# Patient Record
(demographics unavailable — no encounter records)

---

## 2017-06-08 NOTE — EMERGENCY ROOM REPORT
History of Present Illness


General


Chief Complaint:  Pain


Source:  Patient





Present Illness


HPI


Patient presents with complaints of left shoulder pain


Reports recent major oral surgery


And she was concerned that the surgery could potentially have led to her 

discomfort


Denies any fall or trauma


Denies any chest pain or shortness of breath





Patient has very a specific discomfort localized to the lateral aspect of the 

left shoulder


Pain with any attempts at movement of the shoulder and appears to be very 

mechanical denies any neck pain





Denies any abdominal pain denies any swelling of the arm





Please note that the conversation was performed using pen and paper as the 

patient is not able to verbalize secondary to the recent oral surgery


Allergies:  


Coded Allergies:  


     No Known Allergies (Unverified , 2/9/15)





Patient History


Past Medical History:  see triage record


Pertinent Family History:  none


Pregnant Now:  No


Reviewed Nursing Documentation:  PMH: Agreed, PSxH: Agreed





Review of Systems


All Other Systems:  negative except mentioned in HPI





Physical Exam





Vital Signs








  Date Time  Temp Pulse Resp B/P Pulse Ox O2 Delivery O2 Flow Rate FiO2


 


6/8/17 02:23  89 14 159/85 99 Room Air  








Sp02 EP Interpretation:  reviewed, normal


General Appearance:  no apparent distress


Head:  normocephalic, atraumatic


Eyes:  bilateral eye EOMI, bilateral eye PERRL


ENT:  other - Patient has teeth clenched, has apparent recent oral surgery


Neck:  full range of motion, supple


Respiratory:  lungs clear


Cardiovascular #1:  regular rate, rhythm, no edema


Gastrointestinal:  non tender, soft


Musculoskeletal:  other - She has left shoulder against the left body, slowly 

moves the upper arm away however with discomfort, sensory is intact, no signs 

of swelling or edema


Neurologic:  alert, oriented x3, responsive


Skin:  normal color


Lymphatic:  no adenopathy





Medical Decision Making


Diagnostic Impression:  


 Primary Impression:  


 Shoulder pain, acute


ER Course


Multiple differentials considered


Including but not limited to, radiculopathy, referred pain, cold fracture





Patient's x-ray is negative, there is evidence of calcified tendinitis





Patient appears to have outpatient consistent pain medication





And will continue to followup with that specialty


Otherwise neurovascularly intact in a stable for close outpatient followup


Other X-Ray Diagnostic Results


Other X-Ray Diagnostic Results :  


   EP Interpretation:  Yes


   Findings:  no fractures, no dislocation, no soft tissue swelling, other - 

calcified tendon


   Number of Views:  3 - left shoulder





Last Vital Signs








  Date Time  Temp Pulse Resp B/P Pulse Ox O2 Delivery O2 Flow Rate FiO2


 


6/8/17 02:23  89 14 159/85 99 Room Air  








Status:  improved


Disposition:  HOME, SELF-CARE


Condition:  Improved





Additional Instructions:  


Patient is provided with the discharge instructions notified to follow up with 

primary doctor in the next 2-3 days otherwise return to the er with any 

worsening symptoms.


Please note that this report is being documented using DRAGON technology.  This 

can lead to erroneous entry secondary to incorrect interpretation by the 

dictating instrument.











KO BROOKS D.O. Jun 8, 2017 03:23

## 2017-07-19 NOTE — EMERGENCY ROOM REPORT
History of Present Illness


General


Chief Complaint:  Nausea, Vomiting, and Diarrhea


Source:  Patient





Present Illness


HPI


50YOF walk-in with nausea/vomiting and multiple episodes of diarrhea since 

return from trip to [place] where she and daughter swam in lake. The lake had 

stream emptying into it. They were otherwise careful with drinking only bottled 

water, preparing food.


Daughter was sick with same symptoms until she "got some shots" and felt better.


No history of abd/pelvic surgery or other medical problems


Associated with chills but afebrile here


No other medical problems.


Allergies:  


Coded Allergies:  


     No Known Allergies (Unverified , 2/9/15)





Patient History


Past Medical History:  none


Past Surgical History:  none


Pertinent Family History:  none


Social History:  Denies: alcohol use, drug use, smoking


Last Menstrual Period:  na


Pregnant Now:  No


Immunizations:  UTD


Reviewed Nursing Documentation:  PMH: Agreed, PSxH: Agreed





Nursing Documentation-PMH


Past Medical History:  No History, Except For





Review of Systems


All Other Systems:  negative except mentioned in HPI





Physical Exam





Vital Signs








  Date Time  Temp Pulse Resp B/P Pulse Ox O2 Delivery O2 Flow Rate FiO2


 


7/17/17 10:23 99.7 120 18 115/75 97 Room Air  








Sp02 EP Interpretation:  reviewed, abnormal


General Appearance:  normal inspection, well appearing, no apparent distress, 

alert, GCS 15, non-toxic


Head:  normocephalic, atraumatic


Eyes:  bilateral eye EOMI, bilateral eye PERRL


ENT:  normal ENT inspection, hearing grossly normal, normal voice


Neck:  normal inspection, full range of motion, supple, no bony tend


Respiratory:  normal inspection, lungs clear, normal breath sounds, no 

respiratory distress, no retraction, no wheezing


Cardiovascular #1:  regular rate, rhythm, no edema


Gastrointestinal:  normal inspection, normal bowel sounds, non tender, soft, no 

guarding, no hernia


Genitourinary:  no CVA tenderness


Musculoskeletal:  normal inspection, back normal, normal range of motion, Torri'

s Sign negative


Neurologic:  normal inspection, alert, oriented x3, responsive, CNs III-XII nml 

as tested, motor strength/tone normal, speech normal


Psychiatric:  normal inspection, judgement/insight normal, mood/affect normal


Skin:  normal inspection, normal color, no rash





Medical Decision Making


Diagnostic Impression:  


 Primary Impression:  


 Diarrhea


 Qualified Codes:  A09 - Infectious gastroenteritis and colitis, unspecified


ER Course


Diarrhea, nausea after trip to [place]


- Initial tachycardia resolved after GI cocktail, Abx


- Presumed infectious given swimming in lake recently


- Abd non-focal on serial exam


- Tolerating PO after anti-emetic


- Will tx with cipro/flagyl to cover infectious diarrhea and possibly Giardia 

from lake


Close PMD followup


Tolerated initial Abx PO doses in ED





Last Vital Signs








  Date Time  Temp Pulse Resp B/P Pulse Ox O2 Delivery O2 Flow Rate FiO2


 


7/17/17 12:25 99.7 92 18 122/69 98 Room Air  








Status:  improved


Disposition:  HOME, SELF-CARE


Condition:  Improved


Scripts


Dicyclomine Hcl* (BENTYL*) 10 Mg Capsule


10 MG ORAL BID for 7 Days, #30 CAP


   Prov: VINCE RAMOS M.D.         7/17/17 


Ondansetron* (ZOFRAN*) 4 Mg Tablet


4 MG ORAL Q8H Y for Nausea & Vomiting for 7 Days, #14 TAB


   Prov: VINCE RAMOS M.D.         7/17/17 


Metronidazole* (FLAGYL*) 500 Mg Tablet


500 MG ORAL THREE TIMES A DAY for 7 Days, #21 TAB


   Prov: VINCE RAMOS M.D.         7/17/17 


Ciprofloxacin Hcl* (CIPROFLOXACIN HCL*) 500 Mg Tablet


500 MG ORAL Q12H for 7 Days, #14 TAB 0 Refills


   Prov: VINCE RAMOS M.D.         7/17/17


Patient Instructions:  Giardiasis





Additional Instructions:  


- Take both antibiotics until finished


- Take zofran as needed for nausea


- Take bentyl as needed for diarrhea











VINCE RAMOS M.D. Jul 19, 2017 06:55

## 2017-09-27 NOTE — EMERGENCY ROOM REPORT
History of Present Illness


General


Chief Complaint:  Eye Problems


Source:  Patient





Present Illness


[facility]


This is a 50-year-old female with no past medical history.  She presents with 

trauma to the right eye.  She actually stuck herself with a small state from 

her altered plan.  This occurred to the right it and occurred this morning.  

Complaining of pain and foreign body sensation that area.  Redness.  No other 

complaint.  No loss of vision.  Denies any other trauma.  Has not take anything 

for it.


Allergies:  


Coded Allergies:  


     No Known Allergies (Unverified , 2/9/15)





Patient History


Past Medical History:  see triage record, old chart reviewed


Past Surgical History:  other


Pertinent Family History:  none


Social History:  Denies: smoking


Pregnant Now:  No


Immunizations:  other


Reviewed Nursing Documentation:  PMH: Agreed, PSxH: Agreed





Review of Systems


Eye:  Reports: eye pain, Denies: blurred vision


ENT:  Denies: ear pain, nose congestion, throat swelling


Respiratory:  Denies: cough, shortness of breath


Cardiovascular:  Denies: chest pain, palpitations


Gastrointestinal:  Denies: abdominal pain, diarrhea, nausea, vomiting


Musculoskeletal:  Denies: back pain, joint pain


Skin:  Denies: rash


Neurological:  Denies: headache, numbness


Endocrine:  Denies: increased thirst, increased urine


Hematologic/Lymphatic:  Denies: easy bruising


All Other Systems:  negative except mentioned in HPI





Physical Exam





Vital Signs








  Date Time  Temp Pulse Resp B/P (MAP) Pulse Ox O2 Delivery O2 Flow Rate FiO2


 


9/27/17 21:04 97.9 66 18 134/75 97 Room Air  





vitals normal


Sp02 EP Interpretation:  reviewed, normal


General Appearance:  well appearing, no apparent distress, alert


Head:  normocephalic, atraumatic


Eyes:  right eye other - Right eye: Small abrasion to the sclera a next to the 

cornea.  No foreign body.  No cornea abrasion.  Ejection of the sclera 

laterally., bilateral eye PERRL, bilateral eye EOMI


ENT:  hearing grossly normal, normal pharynx


Neck:  full range of motion, supple, no meningismus


Respiratory:  chest non-tender, lungs clear, normal breath sounds


Cardiovascular #1:  regular rate, rhythm, no murmur


Gastrointestinal:  normal bowel sounds, non tender, no mass, no organomegaly, 

no bruit, non-distended


Musculoskeletal:  back normal, gait/station normal, normal range of motion


Psychiatric:  mood/affect normal


Skin:  warm/dry





Medical Decision Making


Diagnostic Impression:  


 Primary Impression:  


 Abrasion of sclera of right eye


 Qualified Codes:  S05.8X1A - Other injuries of right eye and orbit, initial 

encounter


ER Course


Present with superficial abrasion to the sclera.  No globe rupture.  Negative 

Jackie sign.  No cornea abrasion.  No foreign body.  We'll discharge home.





Last Vital Signs








  Date Time  Temp Pulse Resp B/P (MAP) Pulse Ox O2 Delivery O2 Flow Rate FiO2


 


9/27/17 21:12 97.9 65 18 134/75 97 Room Air  








Status:  improved


Disposition:  HOME, SELF-CARE


Condition:  Stable


Scripts


Ibuprofen* (MOTRIN*) 600 Mg Tablet


600 MG ORAL THREE TIMES A DAY, #30 TAB 0 Refills


   Prov: SUSU DUNHAM M.D.         9/27/17 


Polymyxin B Sulf/Trimethoprim (POLYTRIM EYE DROPS) 10 Ml Drops


10 ML OP QID, #10 ML


   Prov: SUSU DUNHAM M.D.         9/27/17





Additional Instructions:  


Followup with your DrEitan in 2-5 days.  Return if symptom worsen.











SUSU DUNHAM M.D. Sep 27, 2017 21:37

## 2017-12-04 NOTE — DIAGNOSTIC IMAGING REPORT
Indication: PAIN



Technique: 3 views of the left shoulder



Comparison: none



Findings: No acute fractures. No dislocations. There is calcific tendinosis of 

the

rotator cuff.



Impression:No acute process



This agrees with the preliminary interpretation provided by the emergency room

physician



This agrees with the preliminary interpretation provided overnight by 

[facility]

teleradiology service. normal...

## 2018-07-16 NOTE — DIAGNOSTIC IMAGING REPORT
Indication: Pain

 

Technique: 3 views of the right shoulder

 

Comparison: none

 

Findings: No acute fractures. No dislocations. Joint spaces are preserved.

 

Impression: Negative

## 2018-07-16 NOTE — EMERGENCY ROOM REPORT
History of Present Illness


General


Chief Complaint:  Upper Extremity Injury


Source:  Patient





Present Illness


HPI


51-year-old female presents ED for evaluation.  Patient complaining of right 

shoulder pain.  Started on Saturday after she cleaned house all day.  Denies 

falling or injuring her shoulder.  Pain is 10 out of 10, throbbing, radiating 

down the arm.  Difficulty raising her arm.  Denies chest pain or shortness of 

breath.  No other aggravating relieving factors.  Denies any other associated 

symptoms


Allergies:  


Coded Allergies:  


     SUMATRIPTAN (Verified  Allergy, Severe, 7/16/18)


 hypertension





Patient History


Past Medical History:  none


Past Surgical History:  none


Pertinent Family History:  none


Social History:  Denies: smoking, alcohol use, drug use


Last Menstrual Period:  last month


Pregnant Now:  No


Immunizations:  UTD


Reviewed Nursing Documentation:  PMH: Agreed; PSxH: Agreed





Nursing Documentation-PMH


Past Medical History:  No History, Except For





Review of Systems


All Other Systems:  negative except mentioned in HPI





Physical Exam





Vital Signs








  Date Time  Temp Pulse Resp B/P (MAP) Pulse Ox O2 Delivery O2 Flow Rate FiO2


 


7/16/18 07:58 97.9 73 18 142/87 97 Room Air  





 97.9       








Sp02 EP Interpretation:  reviewed, normal


General Appearance:  no apparent distress, alert, GCS 15, non-toxic


Head:  normocephalic


Eyes:  bilateral eye normal inspection, bilateral eye PERRL


ENT:  normal ENT inspection


Neck:  full range of motion, no bony tend, supple/symm/no masses


Respiratory:  chest non-tender, lungs clear, normal breath sounds, speaking 

full sentences


Cardiovascular #1:  regular rate, rhythm, no edema


Gastrointestinal:  normal inspection


Rectal:  deferred


Genitourinary:  no CVA tenderness


Musculoskeletal:  decreased range of motion, tender - R shoulder


Neurologic:  alert, oriented x3, responsive, motor strength/tone normal, 

sensory intact, speech normal


Psychiatric:  normal inspection


Skin:  normal inspection


Lymphatic:  normal inspection





Procedures


Splinting


Splinting :  


   Consent:  Verbal


   Pre-Made Type:  sling


   Pre-Proc Neuro Vasc Exam:  normal


   Post-Proc Neuro Vasc Exam:  normal


   Patient Tolerated:  Well


   Complications:  None





Medical Decision Making


Diagnostic Impression:  


 Primary Impression:  


 Shoulder strain


 Qualified Codes:  S46.911A - Strain of unspecified muscle, fascia and tendon 

at shoulder and upper arm level, right arm, initial encounter


ER Course


Hospital Course 


51-year-old F presents to ED complaining of R shoulder pain





Differential diagnoses include: Fracture, dislocation, sprain, contusion





Clinical course


Patient placed on stretcher.  After initial history and physical, I ordered 

pain medications and Xrays of R shoulder





Xrays prelim read shows no acute fracture/dislocation.  placed in sling.  Given 

pain with abduction concern for rotator cuff injury.  Recommend close follow-up 

with orthopedics as outpatient





Diagnosis - shoulder strain





Stable and discharged to home with prescription for Tylenol #3, Robaxin.  apply 

ice.  weight bear as tolerated.  Followup with ortho.  Return to ED if symptoms 

recur or worsen


Other X-Ray Diagnostic Results


Other X-Ray Diagnostic Results :  


   X-Ray ordered:  R shoulder


   # of Views/Limited Vs Complete:  3 View


   Indication:  Pain


   EP Interpretation:  Yes


   Interpretation:  no dislocation, no soft tissue swelling, no fractures


   Impression:  No acute disease


   Electronically Signed by:  Electronically signed by Rell Miranda MD





Last Vital Signs








  Date Time  Temp Pulse Resp B/P (MAP) Pulse Ox O2 Delivery O2 Flow Rate FiO2


 


7/16/18 09:05 98.1 83 20 147/84 98 Room Air  





 208.2       








Status:  improved


Disposition:  HOME, SELF-CARE


Condition:  Stable


Scripts


Methocarbamol* (ROBAXIN-750*) 750 Mg Tablet


750 MG PO TID, #21 TAB 0 Refills


   Prov: Rell Miranda MD         7/16/18 


Acetaminophen With Codeine (T#3) (TYLENOL #3 TAB*) Y Tab


1 TAB ORAL Q8H PRN for For Pain, #20 TAB


   Prov: Rell Miranda MD         7/16/18


Patient Instructions:  Shoulder Sprain











Rell Miranda MD Jul 16, 2018 10:11